# Patient Record
Sex: FEMALE | Race: WHITE | NOT HISPANIC OR LATINO | Employment: FULL TIME | URBAN - METROPOLITAN AREA
[De-identification: names, ages, dates, MRNs, and addresses within clinical notes are randomized per-mention and may not be internally consistent; named-entity substitution may affect disease eponyms.]

---

## 2021-12-01 ENCOUNTER — OFFICE VISIT (OUTPATIENT)
Dept: PODIATRY | Facility: CLINIC | Age: 46
End: 2021-12-01
Payer: COMMERCIAL

## 2021-12-01 DIAGNOSIS — M72.2 PLANTAR FASCIITIS: Primary | ICD-10-CM

## 2021-12-01 PROCEDURE — 99999 PR PBB SHADOW E&M-NEW PATIENT-LVL III: CPT | Mod: PBBFAC,,, | Performed by: PODIATRIST

## 2021-12-01 PROCEDURE — 99203 OFFICE O/P NEW LOW 30 MIN: CPT | Mod: S$GLB,,, | Performed by: PODIATRIST

## 2021-12-01 PROCEDURE — 99999 PR PBB SHADOW E&M-NEW PATIENT-LVL III: ICD-10-PCS | Mod: PBBFAC,,, | Performed by: PODIATRIST

## 2021-12-01 PROCEDURE — 99203 PR OFFICE/OUTPT VISIT, NEW, LEVL III, 30-44 MIN: ICD-10-PCS | Mod: S$GLB,,, | Performed by: PODIATRIST

## 2021-12-28 ENCOUNTER — TELEPHONE (OUTPATIENT)
Dept: PODIATRY | Facility: CLINIC | Age: 46
End: 2021-12-28
Payer: COMMERCIAL

## 2022-01-10 ENCOUNTER — OFFICE VISIT (OUTPATIENT)
Dept: PODIATRY | Facility: CLINIC | Age: 47
End: 2022-01-10
Payer: COMMERCIAL

## 2022-01-10 DIAGNOSIS — M72.2 PLANTAR FASCIITIS: Primary | ICD-10-CM

## 2022-01-10 PROCEDURE — 99499 UNLISTED E&M SERVICE: CPT | Mod: S$GLB,,, | Performed by: PODIATRIST

## 2022-01-10 PROCEDURE — 99499 NO LOS: ICD-10-PCS | Mod: S$GLB,,, | Performed by: PODIATRIST

## 2022-01-10 PROCEDURE — 20550 PR INJECT TENDON SHEATH/LIGAMENT: ICD-10-PCS | Mod: RT,S$GLB,, | Performed by: PODIATRIST

## 2022-01-10 PROCEDURE — 1159F PR MEDICATION LIST DOCUMENTED IN MEDICAL RECORD: ICD-10-PCS | Mod: CPTII,S$GLB,, | Performed by: PODIATRIST

## 2022-01-10 PROCEDURE — 99999 PR PBB SHADOW E&M-EST. PATIENT-LVL II: CPT | Mod: PBBFAC,,, | Performed by: PODIATRIST

## 2022-01-10 PROCEDURE — 99999 PR PBB SHADOW E&M-EST. PATIENT-LVL II: ICD-10-PCS | Mod: PBBFAC,,, | Performed by: PODIATRIST

## 2022-01-10 PROCEDURE — 1160F RVW MEDS BY RX/DR IN RCRD: CPT | Mod: CPTII,S$GLB,, | Performed by: PODIATRIST

## 2022-01-10 PROCEDURE — 20550 NJX 1 TENDON SHEATH/LIGAMENT: CPT | Mod: RT,S$GLB,, | Performed by: PODIATRIST

## 2022-01-10 PROCEDURE — 1160F PR REVIEW ALL MEDS BY PRESCRIBER/CLIN PHARMACIST DOCUMENTED: ICD-10-PCS | Mod: CPTII,S$GLB,, | Performed by: PODIATRIST

## 2022-01-10 PROCEDURE — 1159F MED LIST DOCD IN RCRD: CPT | Mod: CPTII,S$GLB,, | Performed by: PODIATRIST

## 2022-01-10 RX ORDER — DEXAMETHASONE SODIUM PHOSPHATE 4 MG/ML
4 INJECTION, SOLUTION INTRA-ARTICULAR; INTRALESIONAL; INTRAMUSCULAR; INTRAVENOUS; SOFT TISSUE
Status: DISCONTINUED | OUTPATIENT
Start: 2022-01-10 | End: 2022-01-10 | Stop reason: HOSPADM

## 2022-01-10 RX ORDER — TRIAMCINOLONE ACETONIDE 40 MG/ML
40 INJECTION, SUSPENSION INTRA-ARTICULAR; INTRAMUSCULAR
Status: DISCONTINUED | OUTPATIENT
Start: 2022-01-10 | End: 2022-01-10 | Stop reason: HOSPADM

## 2022-01-10 RX ADMIN — TRIAMCINOLONE ACETONIDE 40 MG: 40 INJECTION, SUSPENSION INTRA-ARTICULAR; INTRAMUSCULAR at 09:01

## 2022-01-10 RX ADMIN — DEXAMETHASONE SODIUM PHOSPHATE 4 MG: 4 INJECTION, SOLUTION INTRA-ARTICULAR; INTRALESIONAL; INTRAMUSCULAR; INTRAVENOUS; SOFT TISSUE at 09:01

## 2022-01-10 NOTE — PROGRESS NOTES
Subjective:       Patient ID: Lore Chamberlain is a 46 y.o. female.    Chief Complaint: Plantar Fasciitis (Patient complains of 1/10 pain to right heel at Present. She states she is performing the exercises 3 times a day. The pain in her heel is very nagging and hurts when she is on her feet all day. )      HPI: Lore Chamberlain presents to clinic today to follow-up on plantar fasciitis to the right foot.  Patient states mild discomfort with post static dyskinesia, however this is significantly improved.  Has been compliant with stretching and utilize orthotic therapy.  Has reported utilizing ice at times when in increased pain.  Ambulating in regular shoe gear with minimal to no discomfort at present.  States pain level is a 1/10.   States that she is interested in steroid injection as she is having mild symptoms on the lateral aspect of the right foot. Patient's Primary Care Provider is Janice Dolan MD.     Review of patient's allergies indicates:   Allergen Reactions    Augmentin [amoxicillin-pot clavulanate] Other (See Comments)     Stomach pain       Past Medical History:   Diagnosis Date    Abnormal Pap smear     LGSIL    Abnormal Pap smear of cervix     colpo was negative, per pt    Hyperlipidemia     Thyroid disease        Family History   Problem Relation Age of Onset    Breast cancer Neg Hx     Colon cancer Neg Hx     Ovarian cancer Neg Hx     Thrombophilia Neg Hx        Social History     Socioeconomic History    Marital status:    Tobacco Use    Smoking status: Never Smoker   Substance and Sexual Activity    Alcohol use: No     Alcohol/week: 0.0 standard drinks    Drug use: No    Sexual activity: Yes     Partners: Male     Birth control/protection: OCP     Comment: lorena; mut monog       Past Surgical History:   Procedure Laterality Date    CARPAL TUNNEL RELEASE      right    CHOLECYSTECTOMY      PELVIC LAPAROSCOPY         Review of Systems      Objective:   There were  no vitals taken for this visit.    US Pelvis Comp with Transvag NON-OB (xpd)  Narrative: Comparison: 3/24/2011    Findings: Transabdominal and transvaginal pelvic ultrasound performed.  The uterus measures 8.4 cm in length and 2.8 x 4.1 cm in transverse dimensions.  The endometrium is normal thickness at 1.7 mm.   No discrete uterine fibroids identified. Some fluid   is noted within the cervical canal. The ovaries are normal in size and appearance.  The right ovary measures 2.5 x 1.1 x 2.0 cm.  there is a hypoechoic structures seen in the region of the left adnexa that measures 2.4 x 1.8 x 2.3 cm which may represent   the left ovary but is only seen on the transabdominal exam.  Arterial and venous flow demonstrated.  No free fluid.  Impression: 1.  Left ovary not well seen and thought to only be visualized on the transabdominal portion of the exam.    2.  Trace amount of fluid noted within the cervical canal..    Electronically signed by: OSCAR COREY D.O.  Date:     07/08/15  Time:    09:23       Physical Exam  LOWER EXTREMITY PHYSICAL EXAMINATION    VASCULAR: The right DP pulse is 2/4 and the left DP is 2/4. The right PT pulse is 2/4 and the left PT pulse is 2/4. Proximal to distal, warm to warm. No dependent rubor or elevation palor is noted. Capillary refill time is less than 3 seconds. Hair growth is appreciated to the dorsal foot and digits.    NEUROLOGY: Proprioception is intact, bilateral. Sensation to light touch is intact. Negative Tinel's Sign and negative Valleix Sign. No neurological sensations with compression of the area of Barrera's Nerve in the area of the Abductor Hallucis muscle belly.    ORTHOPEDIC:  Minimal to no tenderness to palpation of the area around the plantar medial calcaneal tubercle on the right foot.  There is also no pain to palpation of the immediate and mild to moderate lateral band of the fascia. No edema is noted.  No plantar fibromas are noted. No defects are noted within the  ligament.  Gait pattern is nonantalgic    DERMATOLOGY: No ecchymosis is noted.  Skin is supple, dry and intact. Skin is supple.  No hyperkeratosis noted. No calluses.  No open wounds or ulcerations are noted.  No palpable plantar fibromas noted.    Assessment:     1. Plantar fasciitis - Right Foot          Plan:     Plantar fasciitis - Right Foot        Thorough discussion is had with the patient today concerning the diagnosis, its etiology, and the treatment algorithm at present.    Continue to encourage patient to perform stretching to the posterior muscle groups  4-6 times per day and holding the stretches for approximately 15-30 seconds apiece to prevent recurrence.  Continue to ambulate in appropriate shoe gear with structure midfoot and orthotic to elevate the plantar fascia and prevent stress.      Did discuss steroid injection as patient is having mild symptoms to the lateral band of the right plantar fascia.  Did discuss post steroid flare.  Please see procedure note for full details.    Patient follow-up p.r.n. for new worsening pathology.  If she continues to have pain, would consider referral to physical therapy      Future Appointments   Date Time Provider Department Center   7/22/2022  8:10 AM [facility]  [facility] SCREEN [facility] [place] [facility]   7/22/2022  9:00 AM Leena Pryor MD [facility]

## 2022-01-10 NOTE — PROCEDURES
Tendon Sheath    Date/Time: 1/10/2022 9:30 AM  Performed by: Julissa Israel DPM  Authorized by: Julissa Israel DPM     Consent Done?:  Yes (Verbal)  Indications:  Pain  Site marked: the procedure site was marked    Timeout: prior to procedure the correct patient, procedure, and site was verified    Prep: patient was prepped and draped in usual sterile fashion      Local anesthesia used?: Yes    Local anesthetic: 0.75% Marcaine plain.  Anesthetic total (ml):  1    Location:  Foot  Foot joint: Right plantar fascia.  Ultrasonic guidance for needle placement?: No    Needle size:  25 G  Approach:  Medial  Medications:  40 mg triamcinolone acetonide 40 mg/mL; 4 mg dexamethasone 4 mg/mL  Patient tolerance:  Patient tolerated the procedure well with no immediate complications

## 2022-06-06 ENCOUNTER — OFFICE VISIT (OUTPATIENT)
Dept: PODIATRY | Facility: CLINIC | Age: 47
End: 2022-06-06
Payer: COMMERCIAL

## 2022-06-06 DIAGNOSIS — M72.2 PLANTAR FASCIITIS: Primary | ICD-10-CM

## 2022-06-06 PROCEDURE — 1159F MED LIST DOCD IN RCRD: CPT | Mod: CPTII,S$GLB,, | Performed by: PODIATRIST

## 2022-06-06 PROCEDURE — 99999 PR PBB SHADOW E&M-EST. PATIENT-LVL II: CPT | Mod: PBBFAC,,, | Performed by: PODIATRIST

## 2022-06-06 PROCEDURE — 99999 PR PBB SHADOW E&M-EST. PATIENT-LVL II: ICD-10-PCS | Mod: PBBFAC,,, | Performed by: PODIATRIST

## 2022-06-06 PROCEDURE — 1160F RVW MEDS BY RX/DR IN RCRD: CPT | Mod: CPTII,S$GLB,, | Performed by: PODIATRIST

## 2022-06-06 PROCEDURE — 99499 UNLISTED E&M SERVICE: CPT | Mod: S$GLB,,, | Performed by: PODIATRIST

## 2022-06-06 PROCEDURE — 20550 NJX 1 TENDON SHEATH/LIGAMENT: CPT | Mod: RT,S$GLB,, | Performed by: PODIATRIST

## 2022-06-06 PROCEDURE — 99499 NO LOS: ICD-10-PCS | Mod: S$GLB,,, | Performed by: PODIATRIST

## 2022-06-06 PROCEDURE — 1160F PR REVIEW ALL MEDS BY PRESCRIBER/CLIN PHARMACIST DOCUMENTED: ICD-10-PCS | Mod: CPTII,S$GLB,, | Performed by: PODIATRIST

## 2022-06-06 PROCEDURE — 1159F PR MEDICATION LIST DOCUMENTED IN MEDICAL RECORD: ICD-10-PCS | Mod: CPTII,S$GLB,, | Performed by: PODIATRIST

## 2022-06-06 PROCEDURE — 20550 PR INJECT TENDON SHEATH/LIGAMENT: ICD-10-PCS | Mod: RT,S$GLB,, | Performed by: PODIATRIST

## 2022-06-06 RX ORDER — TRIAMCINOLONE ACETONIDE 40 MG/ML
40 INJECTION, SUSPENSION INTRA-ARTICULAR; INTRAMUSCULAR
Status: DISCONTINUED | OUTPATIENT
Start: 2022-06-06 | End: 2022-06-06 | Stop reason: HOSPADM

## 2022-06-06 RX ORDER — DEXAMETHASONE SODIUM PHOSPHATE 4 MG/ML
4 INJECTION, SOLUTION INTRA-ARTICULAR; INTRALESIONAL; INTRAMUSCULAR; INTRAVENOUS; SOFT TISSUE
Status: DISCONTINUED | OUTPATIENT
Start: 2022-06-06 | End: 2022-06-06 | Stop reason: HOSPADM

## 2022-06-06 RX ADMIN — DEXAMETHASONE SODIUM PHOSPHATE 4 MG: 4 INJECTION, SOLUTION INTRA-ARTICULAR; INTRALESIONAL; INTRAMUSCULAR; INTRAVENOUS; SOFT TISSUE at 03:06

## 2022-06-06 RX ADMIN — TRIAMCINOLONE ACETONIDE 40 MG: 40 INJECTION, SUSPENSION INTRA-ARTICULAR; INTRAMUSCULAR at 03:06

## 2022-06-06 NOTE — PROCEDURES
Tendon Sheath    Date/Time: 6/6/2022 3:00 PM  Performed by: Julissa Israel DPM  Authorized by: Julissa Israel DPM     Consent Done?:  Yes (Verbal)  Indications:  Pain  Site marked: the procedure site was marked    Timeout: prior to procedure the correct patient, procedure, and site was verified    Prep: patient was prepped and draped in usual sterile fashion      Local anesthesia used?: Yes    Local anesthetic: 0.75% Marcaine plain.  Anesthetic total (ml):  1    Location:  Foot  Foot joint: plantar fascia right.  Ultrasonic guidance for needle placement?: No    Needle size:  25 G  Approach:  Medial  Medications:  40 mg triamcinolone acetonide 40 mg/mL; 4 mg dexamethasone 4 mg/mL  Patient tolerance:  Patient tolerated the procedure well with no immediate complications

## 2022-06-06 NOTE — PROGRESS NOTES
Subjective:       Patient ID: Lore Chamberlain is a 46 y.o. female.    Chief Complaint: Plantar Fasciitis (Patient complains of 5/10 pain at present to right plantar arch and heel. )      HPI: Lore Chamberlain presents to clinic today to follow-up on plantar fasciitis to the right foot.  States attempted to utilize orthotics and performing stretching exercises.  States some mild to minimal improvement.  Continues to state post static dyskinesia with 5/10 pain. States walking and standing causes and/or exacerbates the symptoms.  Patient's Primary Care Provider is Janice Dolan MD.     Review of patient's allergies indicates:   Allergen Reactions    Augmentin [amoxicillin-pot clavulanate] Other (See Comments)     Stomach pain       Past Medical History:   Diagnosis Date    Abnormal Pap smear     LGSIL    Abnormal Pap smear of cervix     colpo was negative, per pt    Hyperlipidemia     Thyroid disease        Family History   Problem Relation Age of Onset    Breast cancer Neg Hx     Colon cancer Neg Hx     Ovarian cancer Neg Hx     Thrombophilia Neg Hx        Social History     Socioeconomic History    Marital status:    Tobacco Use    Smoking status: Never Smoker   Substance and Sexual Activity    Alcohol use: No     Alcohol/week: 0.0 standard drinks    Drug use: No    Sexual activity: Yes     Partners: Male     Birth control/protection: OCP     Comment: lorena; mut monog       Past Surgical History:   Procedure Laterality Date    CARPAL TUNNEL RELEASE      right    CHOLECYSTECTOMY      PELVIC LAPAROSCOPY         Review of Systems      Objective:   There were no vitals taken for this visit.    US Pelvis Comp with Transvag NON-OB (xpd)  Narrative: Comparison: 3/24/2011    Findings: Transabdominal and transvaginal pelvic ultrasound performed.  The uterus measures 8.4 cm in length and 2.8 x 4.1 cm in transverse dimensions.  The endometrium is normal thickness at 1.7 mm.   No discrete  uterine fibroids identified. Some fluid   is noted within the cervical canal. The ovaries are normal in size and appearance.  The right ovary measures 2.5 x 1.1 x 2.0 cm.  there is a hypoechoic structures seen in the region of the left adnexa that measures 2.4 x 1.8 x 2.3 cm which may represent   the left ovary but is only seen on the transabdominal exam.  Arterial and venous flow demonstrated.  No free fluid.  Impression: 1.  Left ovary not well seen and thought to only be visualized on the transabdominal portion of the exam.    2.  Trace amount of fluid noted within the cervical canal..    Electronically signed by: OSCAR COREY D.O.  Date:     07/08/15  Time:    09:23       Physical Exam  LOWER EXTREMITY PHYSICAL EXAMINATION    VASCULAR: The right DP pulse is 2/4 and the left DP is 2/4. The right PT pulse is 2/4 and the left PT pulse is 2/4. Proximal to distal, warm to warm. No dependent rubor or elevation palor is noted. Capillary refill time is less than 3 seconds. Hair growth is appreciated to the dorsal foot and digits.    NEUROLOGY: Proprioception is intact, bilateral. Sensation to light touch is intact. Negative Tinel's Sign and negative Valleix Sign. No neurological sensations with compression of the area of Barrera's Nerve in the area of the Abductor Hallucis muscle belly.    ORTHOPEDIC:  Moderate tenderness to palpation of the area around the plantar medial calcaneal tubercle on the right foot. Pains are characterized as sharp and stabbing-like with direct palpation of the area. There is also mild pain to palpation of the immediate plantar aspect of the heel, and no pains to the lateral band of the fascia. No edema is noted. No fullness is noted. No pains or defects are noted within the plantar fascia at the arch. No plantar fibromas are noted. No defects are noted within the ligament. Dorsiflexion of the MTPJs with simultaneous palpation of the fascia at the arch, does worsen and exacerbate the pains.  No pains with medial to lateral compression of the heel. Equinus contracture is noted. Antalgic gait pattern is noted.     DERMATOLOGY: No ecchymosis is noted.  Skin is supple, dry and intact. Skin is supple.  No hyperkeratosis noted. No calluses.  No open wounds or ulcerations are noted.  No palpable plantar fibromas noted.    Assessment:     1. Plantar fasciitis - Right Foot          Plan:     Plantar fasciitis - Right Foot        Thorough discussion is had with the patient today concerning the diagnosis, its etiology, and the treatment algorithm at present.    Continue to recommend the  importance of stretching to the posterior muscle groups of the gastrocnemius and the soleus.  A stretching sheet was provided to the patient in conjunction with a Thera-Band.  I do recommend patient perform stretching exercises 4-6 times per day and holding the stretches for approximately 15-30 seconds apiece.  We discussed importance of stretching as relates to lengthening the posterior muscle group which can decrease drain on the posterior aspect of the heel as well as the plantar aspect of the heel.  This will also decrease pain associated with post static dyskinesia.  Teach back mechanism was performed with the patient demonstrating the stretching exercises.    Did discussed possible physical therapy versus steroid injection into the affected foot.  Patient relates interest in steroid injection at this time.  Did discuss risk of steroid use as relates to increased anxiety, insomnia, post injection steroid flares, elevated blood sugars, swelling.  Patient lytes understanding.  Recommend use of anti-inflammatories with icing to decrease risk of a post-injection steroid flare.      Future Appointments   Date Time Provider Department Center   7/26/2022 10:20 AM [facility]  [facility]1 SCREEN [facility] [place] [facility]   7/26/2022 11:10 AM Leena Pryor MD [facility] [place] [facility]

## 2022-07-26 PROBLEM — I10 PRIMARY HYPERTENSION: Status: ACTIVE | Noted: 2022-07-26

## 2022-07-26 PROBLEM — E03.9 HYPOTHYROIDISM: Status: ACTIVE | Noted: 2022-07-26
